# Patient Record
Sex: FEMALE | Race: WHITE | Employment: FULL TIME | URBAN - NONMETROPOLITAN AREA
[De-identification: names, ages, dates, MRNs, and addresses within clinical notes are randomized per-mention and may not be internally consistent; named-entity substitution may affect disease eponyms.]

---

## 2019-07-03 ENCOUNTER — HOSPITAL ENCOUNTER (OUTPATIENT)
Age: 54
Discharge: HOME | End: 2019-07-03
Payer: COMMERCIAL

## 2019-07-03 LAB
FREE THYROXINE INDEX: 8.4 UG/DL (ref 5–11)
T4 TOTAL: 7.5 UG/DL (ref 4.5–12)
THYROXINE UPTAKE: 33.2 % (ref 28–41)
THYROXINE, FREE: 1.29 NG/DL (ref 0.93–1.7)
TSH SERPL DL<=0.05 MIU/L-ACNC: 1.11 MIU/L (ref 0.3–5)

## 2019-07-03 PROCEDURE — 84443 ASSAY THYROID STIM HORMONE: CPT

## 2019-07-03 PROCEDURE — 84479 ASSAY OF THYROID (T3 OR T4): CPT

## 2019-07-03 PROCEDURE — 36415 COLL VENOUS BLD VENIPUNCTURE: CPT

## 2019-07-03 PROCEDURE — 84436 ASSAY OF TOTAL THYROXINE: CPT

## 2019-07-03 PROCEDURE — 84439 ASSAY OF FREE THYROXINE: CPT

## 2019-07-31 ENCOUNTER — OFFICE VISIT (OUTPATIENT)
Dept: FAMILY MEDICINE CLINIC | Age: 54
End: 2019-07-31
Payer: COMMERCIAL

## 2019-07-31 VITALS
WEIGHT: 132 LBS | SYSTOLIC BLOOD PRESSURE: 150 MMHG | DIASTOLIC BLOOD PRESSURE: 84 MMHG | BODY MASS INDEX: 24.29 KG/M2 | HEART RATE: 101 BPM | HEIGHT: 62 IN | OXYGEN SATURATION: 97 %

## 2019-07-31 DIAGNOSIS — L65.9 ALOPECIA: Primary | ICD-10-CM

## 2019-07-31 DIAGNOSIS — Z12.11 SCREENING FOR COLON CANCER: ICD-10-CM

## 2019-07-31 DIAGNOSIS — R03.0 ELEVATED BP WITHOUT DIAGNOSIS OF HYPERTENSION: ICD-10-CM

## 2019-07-31 PROCEDURE — 99202 OFFICE O/P NEW SF 15 MIN: CPT | Performed by: FAMILY MEDICINE

## 2019-07-31 RX ORDER — CYANOCOBALAMIN 1000 UG/ML
1000 INJECTION INTRAMUSCULAR; SUBCUTANEOUS
COMMUNITY
Start: 2019-02-14

## 2019-07-31 ASSESSMENT — ENCOUNTER SYMPTOMS
COUGH: 0
VOMITING: 0
SHORTNESS OF BREATH: 0
EYE DISCHARGE: 0
EYE REDNESS: 0
ROS SKIN COMMENTS: HAIR LOSS
BLOOD IN STOOL: 0
DIARRHEA: 0

## 2019-07-31 ASSESSMENT — PATIENT HEALTH QUESTIONNAIRE - PHQ9
1. LITTLE INTEREST OR PLEASURE IN DOING THINGS: 0
SUM OF ALL RESPONSES TO PHQ9 QUESTIONS 1 & 2: 0
SUM OF ALL RESPONSES TO PHQ QUESTIONS 1-9: 0
SUM OF ALL RESPONSES TO PHQ QUESTIONS 1-9: 0
2. FEELING DOWN, DEPRESSED OR HOPELESS: 0

## 2019-07-31 NOTE — PROGRESS NOTES
No rash noted. No erythema. Scalp -  Pt has scattered circular areas where tiny baby fine hairs are growing back. No bald spots now. But the top of pt's scalp has areas of thinning hair and hair very dry and brittle. No skin lesions or erythema or dryness. Vitals reviewed. Vitals:  BP (!) 150/84 (Site: Right Upper Arm, Position: Sitting, Cuff Size: Medium Adult)   Pulse 101   Ht 5' 2\" (1.575 m)   Wt 132 lb (59.9 kg)   SpO2 97%   BMI 24.14 kg/m²       Data:     No results found for: NA, K, CL, CO2, BUN, CREATININE, GLUCOSE, PROT, LABALBU, BILITOT, ALKPHOS, AST, ALT  No results found for: WBC, RBC, HGB, HCT, MCV, MCH, MCHC, RDW, PLT, MPV  Lab Results   Component Value Date    TSH 1.11 07/03/2019     No results found for: CHOL, HDL, PSA, LABA1C       Assessment/Plan:        1. Alopecia  Referral to dermatology and pt to NOT get hair dyed until talks to derm. Suggested also trying biotin tabs but pt wants to wait and derm. All thyroid labs normal  - External Referral To Dermatology    2. Elevated BP without diagnosis of hypertension  Pt to ck BP at home 2 times per week and record over next month. Pt to then bring in results to her well woman OV in 1mos    3. Screening for colon cancer  Order sent  - Cologuard (For External Results Only); Future        Thersia Blowers received counseling on the following healthy behaviors: nutrition, exercise and tobacco cessation  Reviewed prior labs and health maintenance  Continue current medications, diet and exercise. Discussed use, benefit, and side effects of prescribed medications. Barriers to medication compliance addressed. Patient given educational materials - see patient instructions  Was a self-tracking handout given in paper form or via [employer]? Yes    Requested Prescriptions      No prescriptions requested or ordered in this encounter       All patient questions answered. Patient voiced understanding. Quality Measures    Body mass index is 24.14 kg/m².